# Patient Record
Sex: MALE | Race: BLACK OR AFRICAN AMERICAN
[De-identification: names, ages, dates, MRNs, and addresses within clinical notes are randomized per-mention and may not be internally consistent; named-entity substitution may affect disease eponyms.]

---

## 2020-09-03 ENCOUNTER — HOSPITAL ENCOUNTER (EMERGENCY)
Dept: HOSPITAL 26 - MED | Age: 55
Discharge: LEFT BEFORE DISCHARGE | End: 2020-09-03
Payer: MEDICAID

## 2020-09-03 VITALS — DIASTOLIC BLOOD PRESSURE: 75 MMHG | SYSTOLIC BLOOD PRESSURE: 121 MMHG

## 2020-09-03 VITALS — SYSTOLIC BLOOD PRESSURE: 121 MMHG | DIASTOLIC BLOOD PRESSURE: 75 MMHG

## 2020-09-03 VITALS — BODY MASS INDEX: 28.88 KG/M2 | HEIGHT: 69 IN | WEIGHT: 195 LBS

## 2020-09-03 DIAGNOSIS — F12.90: ICD-10-CM

## 2020-09-03 DIAGNOSIS — R10.84: ICD-10-CM

## 2020-09-03 DIAGNOSIS — R06.00: ICD-10-CM

## 2020-09-03 DIAGNOSIS — F41.9: Primary | ICD-10-CM

## 2020-09-03 PROCEDURE — 71045 X-RAY EXAM CHEST 1 VIEW: CPT

## 2020-09-03 PROCEDURE — 93005 ELECTROCARDIOGRAM TRACING: CPT

## 2020-09-03 PROCEDURE — 99283 EMERGENCY DEPT VISIT LOW MDM: CPT

## 2020-09-03 NOTE — NUR
EDUCATING PT REGARDING RISKS OF LEAVING AMA, PT VERBALIZES UNDERSTANDING BUT 
STILL WANTS TO LEAVE AMA. DR. HUBBARD MADE AWARE.

## 2020-09-03 NOTE — NUR
54 Y/O M PRESENTS TO ED C/O FEELING "ANTSY," AND SHOULDER AND ABD PAIN X 2 
HOURS AGO. PT STATES HE WAS SEEN TODAY AT [facility] BUT WAS UNABLE TO SAY 
WHAT FOR. PT DENIES N/V/D, SOB, AND FEVER. AIRWAY IS INTACT, RR EVEN AND 
UNLABORED, O2 SAT AT 97% RA. CAP REFILL <3 SECONDS. PT AMBULATORY AND ABLE TO 
MAKE NEEDS KNOWN. BED LOCKED AND IN LOWEST POSITION, SIDE RAIL UP X1. WILL 
CONTINUE TO MONITOR. 



MHX: PT DENIES

NKA

## 2020-09-03 NOTE — NUR
Patient does not wish to proceed with medical care recommended by DR. HUBBARD. 
 Patient given information related to possible complications, up to and 
including death, which could occur as a result of leaving hospital at this 
time.  Patient verbalizes understanding of risks involved leaving against 
medical advice.  Patient has signed AMA form.